# Patient Record
Sex: FEMALE | Race: OTHER | Employment: UNEMPLOYED | URBAN - METROPOLITAN AREA
[De-identification: names, ages, dates, MRNs, and addresses within clinical notes are randomized per-mention and may not be internally consistent; named-entity substitution may affect disease eponyms.]

---

## 2018-03-03 ENCOUNTER — HOSPITAL ENCOUNTER (EMERGENCY)
Age: 38
Discharge: HOME | End: 2018-03-03
Attending: EMERGENCY MEDICINE
Payer: COMMERCIAL

## 2018-03-03 VITALS
OXYGEN SATURATION: 96 % | HEART RATE: 65 BPM | HEIGHT: 63 IN | TEMPERATURE: 97.2 F | BODY MASS INDEX: 31.36 KG/M2 | WEIGHT: 177 LBS | RESPIRATION RATE: 18 BRPM | SYSTOLIC BLOOD PRESSURE: 155 MMHG | DIASTOLIC BLOOD PRESSURE: 90 MMHG

## 2018-03-03 DIAGNOSIS — M54.41 ACUTE RIGHT-SIDED LOW BACK PAIN WITH RIGHT-SIDED SCIATICA: Primary | ICD-10-CM

## 2018-03-03 DIAGNOSIS — R42 VERTIGO: ICD-10-CM

## 2018-03-03 LAB
APPEARANCE UR: CLEAR
BACTERIA URNS QL MICRO: NEGATIVE /HPF
BILIRUB UR QL: NEGATIVE
COLOR UR: NORMAL
EPITH CASTS URNS QL MICRO: NORMAL /LPF
GLUCOSE UR STRIP.AUTO-MCNC: NEGATIVE MG/DL
HCG UR QL: NEGATIVE
HGB UR QL STRIP: NEGATIVE
HYALINE CASTS URNS QL MICRO: NORMAL /LPF (ref 0–5)
KETONES UR QL STRIP.AUTO: NEGATIVE MG/DL
LEUKOCYTE ESTERASE UR QL STRIP.AUTO: NEGATIVE
NITRITE UR QL STRIP.AUTO: NEGATIVE
PH UR STRIP: 6.5 [PH] (ref 5–8)
PROT UR STRIP-MCNC: NEGATIVE MG/DL
RBC #/AREA URNS HPF: NORMAL /HPF (ref 0–5)
SP GR UR REFRACTOMETRY: 1.03 (ref 1–1.03)
UR CULT HOLD, URHOLD: NORMAL
UROBILINOGEN UR QL STRIP.AUTO: 0.2 EU/DL (ref 0.2–1)
WBC URNS QL MICRO: NORMAL /HPF (ref 0–4)

## 2018-03-03 PROCEDURE — 81001 URINALYSIS AUTO W/SCOPE: CPT | Performed by: EMERGENCY MEDICINE

## 2018-03-03 PROCEDURE — 81025 URINE PREGNANCY TEST: CPT

## 2018-03-03 PROCEDURE — 99283 EMERGENCY DEPT VISIT LOW MDM: CPT

## 2018-03-03 RX ORDER — METHYLPREDNISOLONE 4 MG/1
TABLET ORAL
Qty: 1 DOSE PACK | Refills: 0 | Status: SHIPPED | OUTPATIENT
Start: 2018-03-03 | End: 2018-06-24

## 2018-03-03 RX ORDER — MECLIZINE HCL 12.5 MG 12.5 MG/1
12.5 TABLET ORAL
Qty: 30 TAB | Refills: 0 | Status: SHIPPED | OUTPATIENT
Start: 2018-03-03 | End: 2018-03-13

## 2018-03-03 RX ORDER — MECLIZINE HCL 12.5 MG 12.5 MG/1
12.5 TABLET ORAL
Qty: 60 TAB | Refills: 0 | Status: SHIPPED | OUTPATIENT
Start: 2018-03-03 | End: 2018-03-03

## 2018-03-03 NOTE — DISCHARGE INSTRUCTIONS
Dolor de espalda: Instrucciones de cuidado - [ Back Pain: Care Instructions ]  Instrucciones de cuidado    El dolor de espalda tiene muchas causas posibles. Con frecuencia, está relacionado con problemas en los músculos y ligamentos de la espalda. También podría estar relacionado con problemas de los nervios, discos o huesos de la espalda. Moverse, levantar algo, ponerse de pie, sentarse o dormir de Valery incómoda pueden forzar la espalda. Algunas veces no se da cuenta de que tiene nereyda lesión [employer] tarde. La artritis es otra causa común del dolor de espalda. Aunque colton vez duela mucho, el dolor de espalda por lo general mejora por sí mismo en varias semanas. [address] personas se recuperan en 12 semanas o menos. Hacer un buen tratamiento en el hogar y tener cuidado de no forzar la espalda puede ayudar a que se sienta mejor más pronto. La atención de seguimiento es nereyda parte clave de btut tratamiento y seguridad. Asegúrese de hacer y acudir a todas las citas, y llame a butt médico si está teniendo problemas. También es nereyda buena idea saber los resultados de los exámenes y mantener nereyda lista de los medicamentos que ez. ¿Cómo puede cuidarse en el hogar? · Siéntese o acuéstese en las posiciones más cómodas y que reduzcan el dolor. Trate nereyda de estas posiciones al acostarse:  ¨ Acuéstese boca arriba con las rodillas dobladas y apoyadas sobre almohadones grandes. ¨ Acuéstese en el piso con las piernas sobre el asiento de un sofá o nereyda silla. ¨ Acuéstese de lado con las rodillas y caderas dobladas y [place] entre las piernas. ¨ Acuéstese boca abajo siempre que esta posición no empeore el dolor. · No se siente en la cama y evite los sofás blandos y las posiciones torcidas. El reposo en cama puede aliviar el dolor al principio, lidya retrasa la sanación. Evite reposar en la cama después del primer día de dolor de espalda. · Cambie de posición cada 30 minutos.  Si debe sentarse por [employer], párese y descanse de estar sentado. Levántese y camine, o acuéstese en nereyda posición cómoda. · Pruebe usar nereyda almohadilla térmica a temperatura baja o mediana madhavi 15 a 20 minutos cada 2 ó 3 horas. Pruebe nereyda ducha tibia en vez de nereyda sesión con la almohadilla térmica. · También puede probar nereyda compresa de hielo madhavi 10 a 15 minutos cada 2 a 3 horas. Póngase un paño posey entre la compresa de hielo y la piel. · [employer] analgésicos (medicamentos para el dolor) exactamente según las indicaciones. ¨ Si el médico le recetó un analgésico, tómelo según las indicaciones. ¨ Si no está tomando un analgésico recetado, pregúntele a butt médico si puede lola raven de [employer]. · George caminatas cortas varias veces al día. Usted puede comenzar con 5 a 10 minutos, 3 ó 4 veces al día, y aumentar progresivamente hasta lograr caminatas más largas. Camine en superficies nisreen y evite colinas y escaleras hasta que la espalda esté mejor. · Regrese al Lockie Angelito y otras actividades lo más pronto posible. El descanso continuo sin actividad por lo general no es garcia para la espalda. · Para prevenir el dolor de espalda en el futuro, george ejercicios para estirar y fortalecer la espalda y el abdomen. Aprenda a [place], técnicas seguras para levantar peso y la mecánica corporal apropiada. ¿Cuándo debe pedir ayuda? Llame a butt médico ahora mismo o busque atención médica inmediata si:  ? · Tiene un nuevo entumecimiento en [place]. ? · Tiene un nuevo debilitamiento en [place]. ([place] puede hacer que sea difícil ponerse de pie). ? · Pierde el control de la vejiga o los intestinos. ?Preste especial atención a los cambios en butt christine y asegúrese de comunicarse con butt médico si:  ? · El dolor Peyton Tamera. ? · No está mejorando después de 2 semanas. ¿Dónde puede encontrar más información en inglés? Wei Delvalle a http://da-aminah.info/.   Escriba K417 en la búsqueda para aprender más acerca de \"Dolor de espalda: Instrucciones de cuidado - [ Back Pain: Care Instructions ]. \"  Revisado: 21 marzo, 2017  Versión del contenido: 11.4  © 8162-7823 Healthwise, Incorporated. Las instrucciones de cuidado fueron adaptadas bajo licencia por Good Help Connections (which disclaims liability or warranty for this information). Si usted tiene Otoe Burton afección médica o sobre estas instrucciones, siempre pregunte a butt profesional de christine. Healthwise, Incorporated niega toda garantía o responsabilidad por butt uso de esta información. Vértigo: Instrucciones de cuidado - [ Vertigo: Care Instructions ]  Instrucciones de cuidado    El vértigo es nereyda sensación de que usted o el ambiente que le rodea se mueve cuando no es así. Con frecuencia se describe carmen nereyda sensación de girar, pili vueltas, caer o inclinarse. El vértigo podría hacer que vomite o sienta náuseas. Podría tener dificultades para caminar o estar de pie y podría perder el equilibrio. El vértigo es a [employer] relacionado con un problema del oído interno, lidya puede tener otras causas más serias. Si el vértigo continúa, usted podría necesitar más pruebas para hallar butt causa. La atención de seguimiento es nereyda parte clave de butt tratamiento y seguridad. Asegúrese de hacer y acudir a todas las citas, y llame a butt médico si está teniendo problemas. También es nereyda buena idea saber los resultados de los exámenes y mantener nereyda lista de los medicamentos que ez. ¿Cómo puede cuidarse en el hogar? · No se acueste boca arriba. Elévese un poco. [place] podría reducir la sensación de girar. Mantenga los ojos abiertos. · Muévase despacio para no caer. · Si butt médico le recomienda algún medicamento, tómelo exactamente según las indicaciones. · No conduzca cuando tenga vértigo. Ciertos ejercicios, conocidos carmen ejercicios de [place], pueden ayudar a disminuir el vértigo.  Para hacer los ejercicios de [place]:  · Siéntese al borde de nereyda cama o un sofá y acuéstese rápido del lado que le causa el peor vértigo. Acuéstese de lado, sobre el oído [place]. · Quédese en loraine posición madhavi por lo menos 30 segundos o hasta que el vértigo pase. · Siéntese. Si esto le causa vértigo, espere a que pase. · Repita carlie procedimiento del otro lado. · Repita esto 10 veces. Van estos ejercicios 2 veces al día hasta que la sensación de vértigo desaparezca. ¿Cuándo debe pedir ayuda? Llame al 911 en cualquier momento que considere que necesita atención de emergencia. Por ejemplo, llame si:  ? · Se desmayó (perdió el conocimiento). ? · Tiene síntomas de un ataque cerebral. Estos podrían incluir:  ¨ Entumecimiento, hormigueo, debilitamiento o pérdida de movimiento repentinos en la joshua, el brazo o la pierna, sobre todo si ocurre en un solo lado del cuerpo. ¨ Cambios repentinos en la visión. ¨ Dificultades repentinas para hablar. ¨ Confusión repentina o dificultad para comprender frases sencillas. ¨ Problemas repentinos para caminar o mantener el equilibrio. ¨ Dolor de [place] intenso y repentino, distinto de los kelsey de lisbeth anteriores. ?Llame a butt médico ahora mismo o busque atención médica inmediata si:  ? · Tiene vértigo junto con fiebre, dolor de lisbeth o zumbido [employer] oídos. ? · Tiene náuseas o vómito nuevos o éstos aumentan. ?Preste especial atención a los cambios en butt christine y asegúrese de comunicarse con butt médico si:  ? · El vértigo empeora u ocurre con mayor frecuencia. ? · El vértigo no mejora después de 2 semanas. ¿Dónde puede encontrar más información en inglés? Bernice Galeazzi a http://da-aminah.info/. Rubye Matter V475 en la búsqueda para aprender más acerca de \"Vértigo: Instrucciones de cuidado - [ Vertigo: Care Instructions ]. \"  Revisado: 12 schafer, 2017  Versión del contenido: 11.4  © 8208-3300 Healthwise, Incorporated.  Las instrucciones de cuidado fueron adaptadas bajo licencia por [employer] Connections (which disclaims liability or warranty for this information). Si usted tiene [place] afección médica o sobre estas instrucciones, siempre pregunte a razo profesional de christine. [facility] niega toda garantía o responsabilidad por razo uso de esta información. Dolor en la parte baja de la espalda (lumbalgia): Ejercicios - [ Low Back Pain: Exercises ]  Instrucciones de cuidado  Aquí se presentan algunos ejemplos de ejercicios típicos de rehabilitación para tratar razo afección. Empiece cada ejercicio lentamente. Reduzca la intensidad del ejercicio si Charbel Brandon a tener dolor. Razo médico o fisioterapeuta le dirán cuándo puede comenzar con estos ejercicios y cuáles funcionarán mejor para usted. Cómo hacer los ejercicios  [place]    1. Acuéstese boca abajo, apoyando razo cuerpo con los antebrazos. 2. Van presión con los codos en el piso para elevar la espalda. Al hacer esto, relaje los músculos del estómago y permita que razo espalda se arquee sin usar los músculos de la espalda. Al hacer presión, evite que las caderas o la pelvis se separen del piso. 3. Mantenga la posición de 15 a 30 segundos y luego relájese. 4. Repita de 2 a 4 veces. Ejercicio de alternar brazo y pierna (sheila de caza)    1. Nota: Van carlie ejercicio lentamente. Trate de mantener el cuerpo [employer], y no deje que nereyda cadera caiga más abajo que la [place]. 2. Comience con las darrian y las rodillas en el piso. 3. Contraiga los músculos del abdomen. 4. Eleve nereyda pierna del suelo y manténgala recta detrás de usted. Tenga cuidado de no dejar caer la cadera hacia abajo, porque eso hará que se le tuerza el tronco.  5. Mantenga la posición madhavi unos 6 segundos y luego baje la pierna y Sal a la otra pierna. 6. Repita de 8 a 12 veces con cada pierna.   7. Con el tiempo, vaya aumentando hasta mantenerla de 10 a 30 segundos cada vez.  8. Si se siente estable y seguro con Mg Delvalle elevada, intente levantar el brazo opuesto directamente enfrente de usted al [employer]. Ejercicio de rodillas al pecho    1. Acuéstese boca arriba con las rodillas flexionadas y los pies apoyados sobre el piso. 2. Lleve nereyda rodilla hacia el pecho, manteniendo el otro pie apoyado en el suelo (o dejando la otra pierna recta, carmen lo sienta mejor en la parte baja de la espalda). 3. Mantenga la parte baja de la espalda presionada contra el suelo. Sosténgalo por lo menos de 15 a 30 segundos. 4. Relájese y regrese la rodilla a la posición inicial.  5. Repita el ejercicio con la otra pierna. Repita de 2 a 4 veces con cada pierna. 6. Para lograr mayor estiramiento, deje la otra pierna apoyada en el suelo mientras se oz la rodilla [place]. Abdominales    1. Acuéstese en el suelo boca arriba, con las rodillas dobladas en un ángulo de 90 grados. Los pies deben estar apoyados en el suelo, a unas 12 pulgadas (30 cm) de las nalgas (glúteos). 2. Cruce los [place]. Si esto le causa molestias en el xin, pruebe a poner las darrian detrás del xin (no de la lisbeth), con los codos abiertos. 3. Contraiga lentamente los músculos del abdomen y eleve los omóplatos del suelo. 4. Mantenga la lisbeth alineada con el cuerpo y no presione la barbilla hacia el pecho. 5. Sostenga esta posición por 1 o 2 segundos y después baje lentamente de nuevo hacia el suelo. 6. Repita de 8 a 12 veces. Ejercicio de inclinación de pelvis    1. Acuéstese de espalda con las rodillas flexionadas. 2. \"Tense\" butt estómago. [place] significa apretar los músculos contrayendo el ombligo e imaginando que se mueve hacia la columna vertebral. Deberá sentir carmen si la espalda estuviera ejerciendo presión sobre el suelo y que las caderas y la pelvis se balancean hacia atrás. 3. Mantenga la posición madhavi aproximadamente 6 segundos mientras respira suavemente. 4. Repita de 8 a 12 veces. [place] con el talón    1.  Acuéstese Tarri Aryan Calderón Lean con ambas rodillas flexionadas y los tobillos doblados de manera que solo los talones estén sobre el piso. Las rodillas deben estar dobladas más o menos a 90 grados. 2. A continuación george presión con los talones en el suelo, apriete las nalgas (glúteos) y levante las caderas del suelo hasta que los hombros, las caderas y las rodillas estén en línea recta. 3. Mantenga la posición madhavi unos 6 segundos mientras sigue respirando normalmente, y luego baje lentamente las caderas hacia el piso y descanse por hasta 10 segundos. [address] 8 a 12 repeticiones. Estiramiento de isquiotibiales en el kayli de nereyda madi    1. Acuéstese boca arriba en el kayli de [place], con nereyda pierna a través de la [employer]. 2. Deslice la pierna hacia arriba por la pared, para enderezar la rodilla. Debe sentir un leve estiramiento en la parte posterior de la pierna. 3. Sostenga el estiramiento por lo menos de 15 a 30 segundos. No arquee la espalda, estire los dedos de los pies ni flexione las rodillas. Mantenga un talón tocando el suelo y el otro tocando la pared. 4. Repita con la otra pierna. 5. Repita de 2 a 4 veces con cada pierna. Estiramiento de los músculos flexores de la cadera    1. Póngase de rodillas en el suelo con nereyda Erin Yfn y Sheela Gardiner atrás. Coloque la rodilla de adelante encima del pie. Mantenga la otra rodilla en contacto con el suelo. 2. Empuje lentamente la cadera hacia adelante hasta que sienta un estiramiento en la parte superior del muslo de la pierna que está atrás. 3. Sostenga el estiramiento por lo menos de 15 a 30 segundos. Repita con la otra pierna. 4. Repita de 2 a 4 veces a cada lado. Sentadillas de pared    1. Párese con la espalda a entre 10 y 12 pulgadas (25 a 30 cm) de distancia de la pared. 2. Inclínese hacia la pared [employer] la espalda quede plana sobre mel.   3. Deslícese lentamente hacia abajo hasta que las rodillas estén ligeramente flexionadas, presionando la parte baja de la espalda contra la pared. 4. Mantenga la posición madhavi unos 6 segundos y después deslícese hacia arriba por la pared. 5. Repita de 8 a 12 veces. La atención de seguimiento es nereyda parte clave de butt tratamiento y seguridad. Asegúrese de hacer y acudir a todas las citas, y llame a butt médico si está teniendo problemas. También es nereyda buena idea saber los resultados de los exámenes y mantener nereyda lista de los medicamentos que ez. ¿Dónde puede encontrar más información en inglés? Alber Smith a http://da-aminah.info/. Clayton Dahl X888 en la búsqueda para aprender más acerca de \"Dolor en la parte baja de la espalda (lumbalgia): Ejercicios - [ Low Back Pain: Exercises ]. \"  Revisado: 21 marzo, 2017  Versión del contenido: 11.4  © 7061-2343 Healthwise, Incorporated. Las instrucciones de cuidado fueron adaptadas bajo licencia por Good Help Connections (which disclaims liability or warranty for this information). Si usted tiene [place] afección médica o sobre estas instrucciones, siempre pregunte a butt profesional de christine. Healthwise, Incorporated niega toda garantía o responsabilidad por butt uso de esta información. Estiramientos de la espalda: Ejercicios - [ Back Stretches: Exercises ]  Instrucciones de cuidado  Aquí se presentan algunos ejemplos de ejercicios para estiramiento de la espalda. Empiece cada ejercicio lentamente. Reduzca la intensidad del ejercicio si Nadira Rimes a tener dolor. Butt médico o fisioterapeuta le dirán cuándo puede comenzar con estos ejercicios y cuáles funcionarán mejor para usted. Cómo hacer los ejercicios  Estiramiento sobre la lisbeth    5. Párese cómodamente y separe los pies a la distancia de los hombros.  6. Mirando hacia adelante, levante ambos brazos sobre butt Victor Manuel Ka y trate de alcanzar el techo. No deje que la lisbeth se incline [place] atrás.   7. Mantenga la posición madhavi 15 a 27 segundos y [employer] a los lados.  8. Repita de 2 a 4 veces. Estiramiento lateral    9. Párese cómodamente y separe los pies a la distancia de los hombros.  10. Levante un brazo sobre la lisbeth y luego inclínese hacia el otro [address] [place] [address]. 6. Deslice butt mano por la pierna mientras quita que el peso de butt brazo estire suavemente los músculos laterales. Mantenga la posición entre 15 y 27 segundos. 12. Repita de 2 a 4 veces de cada lado. [place]    7. Acuéstese boca abajo, apoyando butt cuerpo con los antebrazos. 8. Van presión con los codos en el piso para elevar la espalda. Al hacer esto, relaje los músculos del estómago y permita que butt espalda se arquee sin usar los músculos de butt espalda. Al hacer presión, evite que mitch caderas o la pelvis se separen del piso. 9. Mantenga la posición madhavi 15 a 30 segundos y luego relájese. 10. Repita de 2 a 4 veces. Relajamiento y descanso    7. Acuéstese boca arriba con nereyda toalla enrollada debajo de butt xin y Julieta Guile almohada debajo de las rodillas. Extienda los brazos cómodamente a los lados. 8. Relájese y respire con normalidad. 9. Permanezca en esta posición madhavi unos 10 minutos. 10. Si quiere, puede hacer [employer] 2 y 3 veces al día. La atención de seguimiento es nereyda parte clave de butt tratamiento y seguridad. Asegúrese de hacer y acudir a todas las citas, y llame a butt médico si está teniendo problemas. También es nereyda buena idea saber los resultados de los exámenes y mantener nereyda lista de los medicamentos que ez. ¿Dónde puede encontrar más información en inglés? Cha Filler a http://da-aminah.info/. Mamie House Q969 en la búsqueda para aprender más acerca de \"Estiramientos de la espalda: Ejercicios - [ Back Stretches: Exercises ]. \"  Revisado: 21 marzo, 2017  Versión del contenido: 11.4  © 4414-4216 Healthwise, Incorporated. Las instrucciones de cuidado fueron adaptadas bajo licencia por Good Help Connections (which disclaims liability or warranty for this information).  Si usted tiene [place] afección médica o sobre estas instrucciones, siempre pregunte a butt profesional de christine. [facility] niega toda garantía o responsabilidad por butt uso de esta información.

## 2018-03-03 NOTE — ED PROVIDER NOTES
Patient is a 42 y.o. female presenting with back pain. Back Pain    This is a chronic problem. Episode onset: 7 months ago. The problem has been gradually worsening. Patient reports not work related injury. The pain is associated with no known injury. The pain is present in the lower back and right side. The quality of the pain is described as sharp and aching. The pain radiates to the right thigh. The pain is moderate. Exacerbated by: laying on right side. Pertinent negatives include no chest pain, no fever, no numbness, no headaches, no abdominal pain, no bowel incontinence, no bladder incontinence, no dysuria, no paresthesias, no tingling and no weakness. She has tried nothing for the symptoms. Risk factors include obesity. Past Medical History:   Diagnosis Date    IUD (intrauterine device) in place 2011    [facility] [place] [facility] depth of 7.5-8.0 cm       Past Surgical History:   Procedure Laterality Date    HX  SECTION      x3    HX  SECTION  2011         Family History:   Problem Relation Age of Onset    Cancer Maternal Uncle     Diabetes Maternal Grandmother     Diabetes Maternal Grandfather        Social History     Social History    Marital status:      Spouse name: N/A    Number of children: N/A    Years of education: N/A     Occupational History    Not on file. Social History Main Topics    Smoking status: Never Smoker    Smokeless tobacco: Never Used    Alcohol use No    Drug use: No    Sexual activity: Yes     Partners: Male     Other Topics Concern    Not on file     Social History Narrative    ** Merged History Encounter **              ALLERGIES: Review of patient's allergies indicates no known allergies. Review of Systems   Constitutional: Negative for chills and fever. HENT: Negative for congestion, rhinorrhea and sore throat. Eyes: Negative for photophobia.    Respiratory: Negative for apnea and cough. Cardiovascular: Negative for chest pain. Gastrointestinal: Negative for abdominal pain, bowel incontinence, diarrhea, nausea and vomiting. Endocrine: Negative for polydipsia and polyuria. Genitourinary: Negative for bladder incontinence, dysuria, frequency and hematuria. Musculoskeletal: Positive for back pain (right lower back). Neurological: Negative for dizziness, tingling, seizures, weakness, numbness, headaches and paresthesias. Psychiatric/Behavioral: Negative for agitation and confusion. Vitals:    03/03/18 1017   BP: 155/90   Pulse: 65   Resp: 18   Temp: 97.2 °F (36.2 °C)   SpO2: 96%   Weight: 80.3 kg (177 lb)   Height: 5' 3\" (1.6 m)            Physical Exam   Constitutional: She is oriented to person, place, and time. She appears well-developed and well-nourished. HENT:   Head: Normocephalic and atraumatic. Eyes: Conjunctivae and EOM are normal. Pupils are equal, round, and reactive to light. Neck: Normal range of motion. No thyromegaly present. Cardiovascular: Normal rate, regular rhythm and normal heart sounds. No murmur heard. Pulmonary/Chest: Effort normal and breath sounds normal. No respiratory distress. She has no wheezes. She has no rales. Abdominal: Soft. Bowel sounds are normal. She exhibits no distension. There is no tenderness. Musculoskeletal: Normal range of motion. She exhibits no edema or deformity. Negative straight leg raise, no tenderness on b/l leg flexion and extension. Normal strength and ROM. Some pain reproduced upon bending down. Neurological: She is alert and oriented to person, place, and time. Skin: Skin is warm. No erythema. Psychiatric: She has a normal mood and affect.  Her behavior is normal. Thought content normal.        MDM  Number of Diagnoses or Management Options  Acute right-sided low back pain with right-sided sciatica: new and does not require workup  Vertigo: minor  Diagnosis management comments: Pt is a 35yro F with no significant Pmhx who presents to ED complaining of right low back pain. Pt is in currently in no pain and in NAD. 1. Right Low back pain: Likely sciatica or muscular given description of Sx and physical exam.   -No XR needed at this time, pain is not midline and barely reproducible. UA negative  -Steroids pack  -Low back pain exercise instructions given to patient  -Will discharge with recommendations to follow-up with a primary care physician    2.  Vertigo: Pt reports vertigo Sx every 3-5 months.  -Meclizine TID prn        ED Course       Procedures: None

## 2018-06-24 ENCOUNTER — HOSPITAL ENCOUNTER (EMERGENCY)
Age: 38
Discharge: HOME | End: 2018-06-24
Attending: EMERGENCY MEDICINE
Payer: COMMERCIAL

## 2018-06-24 VITALS
HEIGHT: 63 IN | HEART RATE: 74 BPM | RESPIRATION RATE: 16 BRPM | DIASTOLIC BLOOD PRESSURE: 89 MMHG | SYSTOLIC BLOOD PRESSURE: 148 MMHG | BODY MASS INDEX: 32.42 KG/M2 | OXYGEN SATURATION: 98 % | TEMPERATURE: 98.5 F | WEIGHT: 182.98 LBS

## 2018-06-24 DIAGNOSIS — L20.9 ATOPIC DERMATITIS, UNSPECIFIED TYPE: ICD-10-CM

## 2018-06-24 DIAGNOSIS — R59.9 LYMPH NODE ENLARGEMENT: Primary | ICD-10-CM

## 2018-06-24 PROCEDURE — 99282 EMERGENCY DEPT VISIT SF MDM: CPT

## 2018-06-24 RX ORDER — AMOXICILLIN AND CLAVULANATE POTASSIUM 875; 125 MG/1; MG/1
1 TABLET, FILM COATED ORAL 2 TIMES DAILY
Qty: 14 TAB | Refills: 0 | Status: SHIPPED | OUTPATIENT
Start: 2018-06-24

## 2018-06-24 RX ORDER — DESONIDE 0.5 MG/G
OINTMENT TOPICAL 2 TIMES DAILY
Qty: 15 G | Refills: 0 | Status: SHIPPED | OUTPATIENT
Start: 2018-06-24

## 2018-06-24 NOTE — ED PROVIDER NOTES
HPI Comments: 42 y.o. female with no significant past medical history who presents from home via private vehicle with chief complaint of a rash to her face. Pt reports she has had the rash for 8 days. Pt states the rash is mildly-moderately itchy, warm, and slightly painful. Pt reports the symptoms worsen with the heat outdoors. Pt reports she thinks it is an allergic reaction to red meat because that was her only food when symptoms started. Pt states she has not had any allergic reaction to red meat before. Pt reports she tried Benadryl at home with no significant improvement. Pt denies any changes to lotions, perfumes, soaps, or detergents. Pt denies fever, chills, cough, congestion, chest pain, shortness of breath, abdominal pain, nausea, vomiting, diarrhea, difficulty with urination or dysuria. There are no other acute medical concerns at this time. Surgical hx -  x 3  Social hx - Tobacco use: none, Alcohol Use: none    PCP: Kathryn Peace MD    Note written by Hans Dickinson, as dictated by Zahira Flaherty NP 11:26 AM.        The history is provided by the patient. No  was used. Past Medical History:   Diagnosis Date    IUD (intrauterine device) in place 2011    [facility] [place] [place] to depth of 7.5-8.0 cm       Past Surgical History:   Procedure Laterality Date    HX  SECTION      x3    HX  SECTION  2011         Family History:   Problem Relation Age of Onset    Cancer Maternal Uncle     Diabetes Maternal Grandmother     Diabetes Maternal Grandfather        Social History     Social History    Marital status:      Spouse name: N/A    Number of children: N/A    Years of education: N/A     Occupational History    Not on file.      Social History Main Topics    Smoking status: Never Smoker    Smokeless tobacco: Never Used    Alcohol use No    Drug use: No    Sexual activity: Yes     Partners: Male     Other Topics Concern    Not on file     Social History Narrative    ** Merged History Encounter **              ALLERGIES: Review of patient's allergies indicates no known allergies. Review of Systems   Constitutional: Negative for chills and fever. HENT: Negative for ear pain and sore throat. Eyes: Negative for pain. Respiratory: Negative for chest tightness and shortness of breath. Cardiovascular: Negative for chest pain and leg swelling. Gastrointestinal: Negative for abdominal pain, nausea and vomiting. Genitourinary: Negative for dysuria and flank pain. Musculoskeletal: Negative for back pain. Skin: Positive for rash. Neurological: Negative for headaches. All other systems reviewed and are negative. Vitals:    06/24/18 1048   BP: 148/89   Pulse: 74   Resp: 16   Temp: 98.5 °F (36.9 °C)   SpO2: 98%   Weight: 83 kg (182 lb 15.7 oz)   Height: 5' 3\" (1.6 m)            Physical Exam   Constitutional: No distress. HENT:   Head: Normocephalic and atraumatic. Right Ear: Tympanic membrane and external ear normal.   Left Ear: Tympanic membrane and external ear normal.   Nose: Nose normal.   Mouth/Throat: Oropharynx is clear and moist and mucous membranes are normal. No oropharyngeal exudate. Eyes: No scleral icterus. Neck: Neck supple. No tracheal deviation present. Cardiovascular: Normal rate, regular rhythm and intact distal pulses. Pulmonary/Chest: Effort normal. No respiratory distress. Abdominal: She exhibits no distension. Genitourinary:   Genitourinary Comments: deferred   Musculoskeletal: She exhibits no deformity. Lymphadenopathy:   On submental lymph node is enlarged on the left side. Neurological: She is alert. Skin: Skin is warm and dry. Crusty skin across the jaw consistent with contact dermatitis. Psychiatric: She has a normal mood and affect. Nursing note and vitals reviewed.      Note written by Sangeeta Keene Unique Sauceda, as dictated by Marco Sullivan NP 11:32 AM.  MDM  Number of Diagnoses or Management Options  Atopic dermatitis, unspecified type: new and requires workup  Lymph node enlargement: new and requires workup     Amount and/or Complexity of Data Reviewed  Discuss the patient with other providers: yes (Jessica)    Risk of Complications, Morbidity, and/or Mortality  Presenting problems: low  Diagnostic procedures: minimal  Management options: minimal    Patient Progress  Patient progress: stable        ED Course       Procedures       LABORATORY TESTS:  No results found for this or any previous visit (from the past 12 hour(s)). IMAGING RESULTS:    MEDICATIONS GIVEN:  Medications - No data to display    IMPRESSION:  1. Lymph node enlargement    2. Atopic dermatitis, unspecified type        PLAN:  1. Augmentin and Desonide cream  2. F/U with PCP  Return to ED if worse    Discharge Note  12:27 PM  The patient is ready for discharge. The patient's signs, symptoms, diagnosis, and discharge instructions have been discussed and the patient has conveyed their understanding. The patient is to follow up as recommended or return to the ER should their symptoms worsen. Plan has been discussed and the patient is in agreement. Davie Momin Pagé FNP-BC.

## 2018-06-24 NOTE — DISCHARGE INSTRUCTIONS
Dermatitis: Instrucciones de cuidado - [ Dermatitis: Care Instructions ]  Instrucciones de cuidado  Dermatitis es el nombre general de cualquier salpullido o inflamación de la piel. Los distintos tipos de dermatitis causan diferentes tipos de salpullido. [address] causas comunes del salpullido se encuentran los medicamentos nuevos, las plantas (carmen el zumaque venenoso o la hiedra venenosa), el calor y el estrés. Ciertas enfermedades también pueden causar un salpullido. Nereyda reacción alérgica a algo que toca la piel, carmen el látex, el níquel o la hiedra venenosa, se llama dermatitis de contacto. La dermatitis de contacto también puede estar causada por algo que irrita la piel, carmen la [place], nereyda sustancia química o el jabón. [place] tipo de salpullido no se puede transmitir de Kami Elizabeth persona a otra. La duración del salpullido depende de butt causa. El salpullido puede durar unos días o meses. La atención de seguimiento es nereyda parte clave de butt tratamiento y seguridad. Asegúrese de hacer y acudir a todas las citas, y llame a butt médico si está teniendo problemas. También es nereyda buena idea saber los resultados de los exámenes y mantener nereyda lista de los medicamentos que ez. ¿Cómo puede cuidarse en el hogar? · No se rasque el salpullido. Mantenga las uñas cortas y [place]. O use guantes si esto le ayuda a no rascarse. · Lávese la tari solo con agua. Seque la tari con toques suaves de toalla. · Colóquese paños húmedos y fríos sobre el salpullido para reducir la comezón. · Manténgase fresco y evite el sol. · Deje el salpullido en contacto con el aire tanto carmen sea posible. · Si el salpullido le da comezón, use crema de hidrocortisona. Siga las instrucciones de la etiqueta. La loción de calamina podría ayudar en el dulce del salpullido causado por plantas. · [place] un antihistamínico de venta [place], carmen difenhidramina (Benadryl) o loratadina (Claritin), para aliviar la comezón.  Emily y siga todas las indicaciones de la etiqueta. · Si butt médico le recetó nereyda crema, úsela según las indicaciones. Si butt médico le recetó un medicamento, tómelo exactamente según las indicaciones. ¿Cuándo debe pedir ayuda? Llame a butt médico ahora mismo o busque atención médica inmediata si:  ? · Tiene síntomas de infección, tales carmen:  ¨ Aumento del dolor, la hinchazón, la temperatura o el enrojecimiento. ¨ Vetas rojizas que salen de la tari. ¨ Pus que sale de la tari. [place]. ? · Tiene dolor en las articulaciones junto con el salpullido. ?Preste especial atención a los [facility], y asegúrese de comunicarse con butt médico si:  ? · El salpullido está cambiando o empeorando. ? · No mejora carmen se esperaba. ¿Dónde puede encontrar más información en inglés? Thad Castro a http://da-aminah.info/. Gissel [id number] en la búsqueda para aprender más acerca de \"Dermatitis: Instrucciones de cuidado - [ Dermatitis: Care Instructions ]. \"  Revisado: 13 octubre, 2016  Versión del contenido: 11.4  © 4882-3283 Healthwise, Incorporated. Las instrucciones de cuidado fueron adaptadas bajo licencia por [employer] (which disclaims liability or warranty for this information). Si usted tiene [place] afección médica o sobre estas instrucciones, siempre pregunte a butt profesional de christine. Healthwise, Incorporated niega toda garantía o responsabilidad por butt uso de esta información. Ganglios linfáticos inflamados: Instrucciones de cuidado - [ Swollen Lymph Nodes: Care Instructions ]  Instrucciones de cuidado    Los ganglios linfáticos son glándulas pequeñas en forma de frijol (habichuela) que se encuentran en todo el cuerpo. Ayudan al cuerpo a combatir los gérmenes y las infecciones. Los ganglios linfáticos suelen inflamarse cuando hay un problema, carmen nereyda lesión, nereyda infección o un tumor.   · Los ganglios en el xin, debajo del mentón o detrás de las orejas podrían inflamarse cuando usted tenga un resfriado o dolor de garganta. · Nereyda lesión o infección en la pierna o el pie puede hacer que los ganglios en la javad se inflamen. · A veces un medicamento puede causar inflamación en los ganglios linfáticos, lidya eso es poco común. El tratamiento depende de lo que haya causado la inflamación de los ganglios. Los ganglios suelen volver a butt tamaño normal sin problemas. La atención de seguimiento es nereyda parte clave de butt tratamiento y seguridad. Asegúrese de hacer y acudir a todas las citas, y llame a butt médico si está teniendo problemas. También es nereyda buena idea saber los resultados de los exámenes y mantener nereyda lista de los medicamentos que ez. ¿Cómo puede cuidarse en el hogar? · [place] mitch medicamentos exactamente carmen le fueron recetados. Llame a butt médico si antony estar teniendo problemas con butt medicamento. · Evite la irritación. ¨ No apriete ni hurgue el abultamiento. ¨ No lo pinche con [place]. · Prevenga infecciones. No apriete, drene ni pinche un abultamiento adolorido. [place] puede irritar o inflamar el abultamiento, hacer que nereyda infección presente se mueva más profundo en la piel o causar sangrado intenso. · Descanse más de lo usual. Disminuya un poco butt rutina habitual.  · Negrita abundantes líquidos, suficientes para que butt orina sea de color amarillo nayla o transparente carmen el agua. Si tiene nereyda enfermedad del riñón, del corazón o del hígado y tiene que [place]'s líquidos, hable con butt médico antes de aumentar butt consumo. · [place] un analgésico (medicamento para el dolor) de venta franky, carmen acetaminofén (Tylenol), ibuprofeno (Advil, Motrin) o naproxeno (Aleve). Emily y siga todas las instrucciones de la [place]. · No tome dos o más analgésicos al mismo tiempo a menos que el médico se lo haya indicado. Muchos analgésicos contienen acetaminofén, es decir, Tylenol. El exceso de acetaminofén (Tylenol) puede ser dañino. ¿Cuándo debe pedir ayuda?   Preste especial atención a los cambios en butt christine y asegúrese de comunicarse con butt médico si:  ? · Los ganglios linfáticos no disminuyen de [place], o se vuelven más grandes. ? · Tiene la trai enrojecida y sensible. ? · Los ganglios se sienten duros y no se mueven al presionarlos. ? · Tiene fiebre de 100°F (37.8°C). ? · Tiene sudoración nocturna. ? · [place] de peso sin la intención de [place]. ¿Dónde puede encontrar más información [facility]? Ciera Cruz a http://da-aminah.info/. Brian Res [id number] en la búsqueda para aprender más acerca de \"Ganglios linfáticos inflamados: Instrucciones de cuidado - [ Swollen Lymph Nodes: Care Instructions ]. \"  Revisado: 3 Kervin David 2017  Versión del contenido: 11.4  © 1475-5690 Healthwise, Incorporated. Las instrucciones de cuidado fueron adaptadas bajo licencia por [employer] (which disclaims liability or warranty for this information). Si usted tiene [place] afección médica o sobre estas instrucciones, siempre pregunte a butt profesional de christine. Healthwise, Incorporated niega toda garantía o responsabilidad por butt uso de esta información. We hope that we have addressed all of your medical concerns. The examination and treatment you received in the Emergency Department were for an emergent problem and were not intended as complete care. It is important that you follow up with your healthcare provider(s) for ongoing care. If your symptoms worsen or do not improve as expected, and you are unable to reach your usual health care provider(s), you should return to the Emergency Department. Today's healthcare is undergoing tremendous change, and patient satisfaction surveys are one of the many tools to assess the quality of medical care. You may receive a survey from the [employer] regarding your experience in the Emergency Department. I hope that your experience has been completely positive, particularly the medical care that I provided.   As such, please participate in the survey; anything less than excellent does not meet my expectations or intentions. [facility] and [facility] participate in nationally recognized quality of care measures. If your blood pressure is greater than 120/80, as reported below, we urge that you seek medical care to address the potential of high blood pressure, commonly known as hypertension. Hypertension can be hereditary or can be caused by certain medical conditions, pain, stress, or \"white coat syndrome. \"       Please make an appointment with your health care provider(s) for follow up of your Emergency Department visit. VITALS:   Patient Vitals for the past 8 hrs:   Temp Pulse Resp BP SpO2   06/24/18 1048 98.5 °F (36.9 °C) 74 16 148/89 98 %          Thank you for allowing us to provide you with medical care today. We realize that you have many choices for your emergency care needs. Please choose us in the future for any continued health care needs. CHERYLE Crawford 70: [phone]            No results found for this or any previous visit (from the past 24 hour(s)). No results found.

## 2022-05-26 ENCOUNTER — APPOINTMENT (OUTPATIENT)
Dept: VASCULAR SURGERY | Age: 42
End: 2022-05-26
Attending: EMERGENCY MEDICINE

## 2022-05-26 ENCOUNTER — HOSPITAL ENCOUNTER (EMERGENCY)
Age: 42
Discharge: HOME | End: 2022-05-26
Attending: EMERGENCY MEDICINE

## 2022-05-26 VITALS
RESPIRATION RATE: 16 BRPM | WEIGHT: 192 LBS | HEIGHT: 62 IN | TEMPERATURE: 98.9 F | OXYGEN SATURATION: 98 % | HEART RATE: 83 BPM | BODY MASS INDEX: 35.33 KG/M2 | SYSTOLIC BLOOD PRESSURE: 123 MMHG | DIASTOLIC BLOOD PRESSURE: 86 MMHG

## 2022-05-26 DIAGNOSIS — Z51.89 ENCOUNTER FOR WOUND RE-CHECK: ICD-10-CM

## 2022-05-26 DIAGNOSIS — M79.601 RIGHT ARM PAIN: Primary | ICD-10-CM

## 2022-05-26 DIAGNOSIS — M79.661 RIGHT CALF PAIN: ICD-10-CM

## 2022-05-26 PROCEDURE — 93971 EXTREMITY STUDY: CPT

## 2022-05-26 PROCEDURE — 99284 EMERGENCY DEPT VISIT MOD MDM: CPT

## 2022-05-26 NOTE — ED PROVIDER NOTES
Please note that this dictation was completed with Soko, the computer voice recognition software.  Quite often unanticipated grammatical, syntax, homophones, and other interpretive errors are inadvertently transcribed by the computer software.  Please disregard these errors.  Please excuse any errors that have escaped final proofreading. Patient is a 47-year-old female presenting to ED for evaluation of right-sided arm and right calf pain with onset several days ago. She notes that she is 8 days postop from a left breast surgery, stating she had \"tissue removal\" secondary to mastitis and had a drain placed. She will like the wound is healing well and has a follow-up with her surgeon yesterday. She spoke with her surgeon today who recommended her coming to the ER for evaluation of possible DVT. Denies chest pain or shortness of breath.            Past Medical History:   Diagnosis Date    IUD (intrauterine device) in place 2011    [facility] [place] [place] to depth of 7.5-8.0 cm       Past Surgical History:   Procedure Laterality Date    HX  SECTION      x3    HX  SECTION  2011         Family History:   Problem Relation Age of Onset    Cancer Maternal Uncle     Diabetes Maternal Grandmother     Diabetes Maternal Grandfather        Social History     Socioeconomic History    Marital status:      Spouse name: Not on file    Number of children: Not on file    Years of education: Not on file    Highest education level: Not on file   Occupational History    Not on file   Tobacco Use    Smoking status: Never Smoker    Smokeless tobacco: Never Used   Substance and Sexual Activity    Alcohol use: No    Drug use: No    Sexual activity: Yes     Partners: Male   Other Topics Concern    Not on file   Social History Narrative    ** Merged History Encounter **          Social Determinants of Health     Financial Resource Strain:     Difficulty of Paying Living Expenses: Not on file   Food Insecurity:     Worried About Running Out of Food in the Last Year: Not on file    Ran Out of Food in the Last Year: Not on file   Transportation Needs:     Lack of Transportation (Medical): Not on file    Lack of Transportation (Non-Medical): Not on file   Physical Activity:     Days of Exercise per Week: Not on file    Minutes of Exercise per Session: Not on file   Stress:     Feeling of Stress : Not on file   Social Connections:     Frequency of Communication with Friends and Family: Not on file    Frequency of Social Gatherings with Friends and Family: Not on file    Attends Orthodoxy Services: Not on file    Active Member of [address] [place] [address] [employer] or Organizations: Not on file    Attends Club or Organization Meetings: Not on file    Marital Status: Not on file   Intimate Partner Violence:     Fear of Current or Ex-Partner: Not on file    Emotionally Abused: Not on file    Physically Abused: Not on file    Sexually Abused: Not on file   Housing Stability:     Unable to Pay for Housing in the Last Year: Not on file    Number of [place] in the Last Year: Not on file    Unstable Housing in the Last Year: Not on file         ALLERGIES: Patient has no known allergies. Review of Systems   Constitutional: Negative for chills and fever. HENT: Negative for congestion, ear pain and sore throat. Eyes: Negative for visual disturbance. Respiratory: Negative for cough and shortness of breath. Cardiovascular: Negative for chest pain. Gastrointestinal: Negative for abdominal pain, diarrhea, nausea and vomiting. Genitourinary: Negative for dysuria and flank pain. Musculoskeletal: Positive for myalgias. Negative for back pain. Skin: Positive for wound. Negative for color change. Neurological: Negative for dizziness and headaches. Psychiatric/Behavioral: Negative for confusion.        Vitals:    05/26/22 1201   BP: 123/86   Pulse: 83   Resp: 16   Temp: 98.9 °F (37.2 °C)   SpO2: 98%   Weight: 87.1 kg (192 lb)   Height: 5' 2\" (1.575 m)            Physical Exam  Vitals and nursing note reviewed. Constitutional:       General: She is not in acute distress. Appearance: Normal appearance. She is not ill-appearing. HENT:      Head: Normocephalic and atraumatic. Eyes:      General: Vision grossly intact. Extraocular Movements: Extraocular movements intact. Conjunctiva/sclera: Conjunctivae normal.   Neck:      Trachea: Phonation normal.   Cardiovascular:      Rate and Rhythm: Normal rate and regular rhythm. Heart sounds: Normal heart sounds. Pulmonary:      Effort: Pulmonary effort is normal.      Breath sounds: Normal breath sounds and air entry. Chest:      Comments: Incision site to the left lower breast with drain in place. No signs of cellulitis, purulence at this time. Abdominal:      Palpations: Abdomen is soft. Tenderness: There is no abdominal tenderness. Musculoskeletal:         General: Normal range of motion. Right elbow: Normal.      Right forearm: Tenderness (No overlying erythema or skin changes) present. No bony tenderness. Right wrist: Normal.   Skin:     General: Skin is warm and dry. Neurological:      General: No focal deficit present. Mental Status: She is alert and oriented to person, place, and time. Psychiatric:         Behavior: Behavior normal.          MDM  Number of Diagnoses or Management Options  Encounter for wound re-check  Right arm pain  Right calf pain  Diagnosis management comments: Patient is alert, afebrile, vital stable. Presents ambulatory 8 days after a left breast surgery for mastitis with right arm and calf pain, concern for DVT. Ultrasound is negative. Wound appears to be well-healing, she will continue as scheduled with her surgical follow-up tomorrow. Return precautions outlined. Questions answered at this time.     Due to language barrier, family member (pt preference) was used throughout history, physical exam, and subsequent discussion with this patient. 2:43 PM  Pt has been reevaluated. There are no new complaints, changes, or physical findings at this time. All results have been reviewed with patient and/or family. Medications have been reviewed w/ pt and/or family. Pt and/or family's questions have been answered. Pt and/or family expressed good understanding of the dx/tx/rx and is in agreement with plan of care. Pt instructed and agreed to f/u w/s surgery and to return to ED upon further deterioration. Return precautions outlined. All questions answered at this time. Pt is stable and ready for discharge. IMPRESSION:  1. Right arm pain    2. Encounter for wound re-check    3. Right calf pain        PLAN:  1. Current Discharge Medication List        2.    Follow-up Information     Follow up With Specialties Details Why Contact Info    Your Breast Surgeon  Go in 1 day As scheduled             Return to ED if worse     Procedures

## 2024-10-10 NOTE — ED TRIAGE NOTES
Alternate Tylenol and ibuprofen as needed for body aches and    Take Zithromax antibiotic for respiratory tract    Take Tessalon Perles for nighttime cough    Take albuterol inhaler as needed for wheezing    Increase your fluid intake with water Pedialyte    Follow-up with your family doctor as needed return to ER for worsening symptom   Pt reports right forearm pain and swelling. Had surgery on left breast 8 days ago.  Has had intermittent fevers